# Patient Record
Sex: FEMALE
[De-identification: names, ages, dates, MRNs, and addresses within clinical notes are randomized per-mention and may not be internally consistent; named-entity substitution may affect disease eponyms.]

---

## 2023-04-05 ENCOUNTER — NURSE TRIAGE (OUTPATIENT)
Dept: OTHER | Facility: CLINIC | Age: 28
End: 2023-04-05

## 2023-04-06 NOTE — TELEPHONE ENCOUNTER
Location of patient: [place]    Subjective: Caller states \"8 wks pregnant; headache\"     Current Symptoms:   Headaches   Asking what medications to take     Pain Severity:   Headache     Recommended disposition: Follow up with pharmacist     Care advice provided, patient verbalizes understanding; denies any other questions or concerns. Outcome:  Follow up with pharmacist       This triage is a result of a call to the [address] [place] [address]    Reason for Disposition   [1] Caller has medicine question about med NOT prescribed by PCP AND [2] triager unable to answer question (e.g., compatibility with other med, storage)    Protocols used: Medication Question Call-ADULT-